# Patient Record
Sex: FEMALE | URBAN - METROPOLITAN AREA
[De-identification: names, ages, dates, MRNs, and addresses within clinical notes are randomized per-mention and may not be internally consistent; named-entity substitution may affect disease eponyms.]

---

## 2022-11-02 ENCOUNTER — OFFICE VISIT (OUTPATIENT)
Facility: LOCATION | Age: 57
End: 2022-11-02
Payer: COMMERCIAL

## 2022-11-02 DIAGNOSIS — H35.033 HYPERTENSIVE RETINOPATHY, BILATERAL: ICD-10-CM

## 2022-11-02 DIAGNOSIS — H34.8320 BRANCH RETINAL VEIN OCCLUSION W/ MACULAR EDEMA, LEFT EYE: Primary | ICD-10-CM

## 2022-11-02 PROCEDURE — 99204 OFFICE O/P NEW MOD 45 MIN: CPT | Performed by: OPTOMETRIST

## 2022-11-02 PROCEDURE — 92134 CPTRZ OPH DX IMG PST SGM RTA: CPT | Performed by: OPTOMETRIST

## 2022-11-02 PROCEDURE — 92250 FUNDUS PHOTOGRAPHY W/I&R: CPT | Performed by: OPTOMETRIST

## 2022-11-02 ASSESSMENT — INTRAOCULAR PRESSURE
OD: 15
OS: 13

## 2022-11-02 NOTE — IMPRESSION/PLAN
Impression: Hypertensive retinopathy, bilateral: H35.033. Plan: Patient advised of mild signs of hypertensive retinopathy noted on exam. Patient encouraged to continue to monitor BP and continue regular follow ups with PCP.

## 2022-11-02 NOTE — IMPRESSION/PLAN
Impression: Branch retinal vein occlusion w/ macular edema, left eye: N46.0830. Plan: Refer to Retina 1 week The patient has a branch retinal vein occlusion (BRVO). An OCT was obtained today which shows cystoid macular edema. We discussed the importance of a cardiovascular evaluation with primary care physician, including optimizing blood pressure. We discussed the natural history of BRVOs, and we reviewed treatment options including observation, anti-VEGF and steroid injection, laser. Will refer to retina for further evaluation and possible treatment.

## 2022-11-15 ENCOUNTER — OFFICE VISIT (OUTPATIENT)
Dept: URBAN - METROPOLITAN AREA CLINIC 7 | Facility: CLINIC | Age: 57
End: 2022-11-15
Payer: MEDICARE

## 2022-11-15 DIAGNOSIS — H25.13 AGE-RELATED NUCLEAR CATARACT, BILATERAL: ICD-10-CM

## 2022-11-15 DIAGNOSIS — H34.8320 TRIBUTARY (BRANCH) RETINAL VEIN OCCLUSION, LEFT EYE, WITH MACULAR EDEMA: Primary | ICD-10-CM

## 2022-11-15 PROCEDURE — 67028 INJECTION EYE DRUG: CPT | Performed by: OPHTHALMOLOGY

## 2022-11-15 PROCEDURE — 99204 OFFICE O/P NEW MOD 45 MIN: CPT | Performed by: OPHTHALMOLOGY

## 2022-11-15 PROCEDURE — 92134 CPTRZ OPH DX IMG PST SGM RTA: CPT | Performed by: OPHTHALMOLOGY

## 2022-11-15 ASSESSMENT — INTRAOCULAR PRESSURE
OD: 14
OS: 14

## 2022-11-15 NOTE — IMPRESSION/PLAN
Impression: Tributary (branch) retinal vein occlusion, left eye, with macular edema: T43.4792. Plan: She complains of blurry vision OS x 1 month. The patient has a branch retinal vein occlusion (BRVO). An OCT was obtained today which shows no IRF/SRF OD and CME/SRF OS. We discussed the importance of a cardiovascular evaluation with her primary care physician, including optimizing his blood pressure. We discussed the natural history of BRVOs. Based on today's findings, I recommend treatment. Avastin injected OS without complication after discussing the R/IB/A in detail. 
thanks Dr. Perera Medicine RTC 1 month OCT OU; avastin OS#2 Prior notes from other provider(s) have been reviewed in conjunction with today's visit.

## 2023-02-07 ENCOUNTER — OFFICE VISIT (OUTPATIENT)
Dept: URBAN - METROPOLITAN AREA CLINIC 7 | Facility: CLINIC | Age: 58
End: 2023-02-07
Payer: COMMERCIAL

## 2023-02-07 DIAGNOSIS — H34.8320 TRIBUTARY (BRANCH) RETINAL VEIN OCCLUSION, LEFT EYE, WITH MACULAR EDEMA: Primary | ICD-10-CM

## 2023-02-07 DIAGNOSIS — H25.13 AGE-RELATED NUCLEAR CATARACT, BILATERAL: ICD-10-CM

## 2023-02-07 PROCEDURE — 67028 INJECTION EYE DRUG: CPT | Performed by: OPHTHALMOLOGY

## 2023-02-07 PROCEDURE — 92012 INTRM OPH EXAM EST PATIENT: CPT | Performed by: OPHTHALMOLOGY

## 2023-02-07 PROCEDURE — 92134 CPTRZ OPH DX IMG PST SGM RTA: CPT | Performed by: OPHTHALMOLOGY

## 2023-02-07 ASSESSMENT — INTRAOCULAR PRESSURE
OD: 12
OS: 13

## 2023-02-07 NOTE — IMPRESSION/PLAN
Impression: Tributary (branch) retinal vein occlusion, left eye, with macular edema: F82.7718. Plan: She was lost to f/u and complains of blurry vision OU. She is s/p avastin OS x1. OCT today shows no IRF/SRF OD and severe CME/SRF OS. We discussed the findings and natural history. . Based on today's findings, I recommend treatment. Sample Lucentis 0.5 injected OS without complication after discussing the R/IB/A in detail. We discussed her vision may not improve. thanks Dr. Daniel Short RTC 1 month OCT OU; avastin OS#2